# Patient Record
Sex: MALE | Employment: FULL TIME | URBAN - METROPOLITAN AREA
[De-identification: names, ages, dates, MRNs, and addresses within clinical notes are randomized per-mention and may not be internally consistent; named-entity substitution may affect disease eponyms.]

---

## 2024-04-04 ENCOUNTER — OFFICE VISIT (OUTPATIENT)
Age: 39
End: 2024-04-04
Payer: COMMERCIAL

## 2024-04-04 DIAGNOSIS — E11.69 TYPE 2 DIABETES MELLITUS WITH OTHER SPECIFIED COMPLICATION, WITHOUT LONG-TERM CURRENT USE OF INSULIN (HCC): Primary | ICD-10-CM

## 2024-04-04 PROCEDURE — G0108 DIAB MANAGE TRN  PER INDIV: HCPCS

## 2024-05-07 ENCOUNTER — NURSE ONLY (OUTPATIENT)
Age: 39
End: 2024-05-07
Payer: COMMERCIAL

## 2024-05-07 DIAGNOSIS — E11.69 TYPE 2 DIABETES MELLITUS WITH OTHER SPECIFIED COMPLICATION, WITHOUT LONG-TERM CURRENT USE OF INSULIN (HCC): Primary | ICD-10-CM

## 2024-05-07 PROCEDURE — G0109 DIAB MANAGE TRN IND/GROUP: HCPCS

## 2024-05-08 NOTE — PROGRESS NOTES
[facility]  Diabetes Self-Management Education & Support Program  Encounter Note      SUMMARY  Diabetes self-care management training was completed related to reducing risks and monitoringr. The participant will return on May 14 to continue DSMES related to healthy eating and monitoring. The participant did not identify SMART Goal(s) and will practice knowledge and skills related to reducing risks, healthy eating and monitoring, being active and medications, and healthy coping and problem solving to improve diabetes self-management.        EVALUATION:  Mr. Bear expressed understanding of all topics related to Reducing Risks, see boxes below for details.  Mr. Bear is due for the influenza and pneumonia vaccines, a DM eye exam, dental exam, and DM foot exam (he does check his feet at home and denies wounds/infection/neuropathy).  He understands his labs related to DM, is taking all DM medications as directed, and denies symptoms of sleep apnea.  He was very engaged in class and shared how he uses CGM, which actually helped another patient make the decision to  her CGM from her pharmacy, which had not done out of fear of applying the sensor.     RECOMMENDATIONS:  Mr. Bear would benefit from scheduling exams for eye and dental care as soon as possible.    TOPICS DISCUSSED TODAY:  WHAT IS DIABETES? Minutes: 30  HOW DO I STAY HEALTHY? 60  HOW CAN BLOOD GLUCOSE MONITORING HELP ME? 30      Next provider visit is scheduled for 5/14/2024 for DSMES       SMART GOAL(S)   TBD  ACHIEVEMENT OF GOAL(S) :     2.     ACHIEVEMENT OF GOAL(S) :      3.     ACHIEVEMENT OF GOAL(S) :           DATE DSMES TOPIC EVALUATION     5/8/2024 WHAT IS DIABETES?   Role of the normal pancreas in energy balance and blood glucose control   The defect seen in diabetes   Signs & symptoms of diabetes   Diagnosis of diabetes   Types of diabetes   Blood glucose targets in non-pregnant & non-pregnant adults

## 2024-05-14 ENCOUNTER — NURSE ONLY (OUTPATIENT)
Age: 39
End: 2024-05-14
Payer: COMMERCIAL

## 2024-05-14 DIAGNOSIS — E11.69 TYPE 2 DIABETES MELLITUS WITH OTHER SPECIFIED COMPLICATION, WITHOUT LONG-TERM CURRENT USE OF INSULIN (HCC): Primary | ICD-10-CM

## 2024-05-14 PROCEDURE — G0109 DIAB MANAGE TRN IND/GROUP: HCPCS

## 2024-05-15 NOTE — PROGRESS NOTES
ACHIEVEMENT OF GOAL(S) :      3.     ACHIEVEMENT OF GOAL(S) :         DATE DSMES TOPIC EVALUATION     5/15/2024 WHAT CAN I EAT?   General principles   Determining a healthy weight   Nutritional terms & tools   Healthy Plate method   Carbohydrate Counting   Reading food labels   Free apps   Pregnancy recommendations      The participant   Uses Healthy Plate principles in constructing meals: Yes  Reads food labels in choosing acceptable foods: Yes    The participant needs to address - see SMART Goal above.     DATE DSMES TOPIC EVALUATION     5/15/2024 HOW CAN BLOOD GLUCOSE MONITORING HELP ME?   Value of blood glucose monitoring   Realistic expectations   Blood glucose monitoring targets   Target adjustments   Setting a1c & blood glucose targets with provider   Meter selection    Technique for obtaining blood droplet   Blood glucose testing sites   Determining best times to test   Pregnancy recommendations   Data sharing with provider        The participant   Can demonstrate their glucometer procedure: Yes  Logs their BG readings:  Yes    The participant needs to address n/a, using CGM to it's greatest potential.     ALVIN VAN RN on 5/15/2024 at 1:28 PM    I have personally reviewed the health record, including provider notes, laboratory data and current medications before making these care and education recommendations. The time spent in this effort is included in the total time.  Total minutes: 120

## 2024-05-21 ENCOUNTER — NURSE ONLY (OUTPATIENT)
Age: 39
End: 2024-05-21
Payer: COMMERCIAL

## 2024-05-21 DIAGNOSIS — E11.69 TYPE 2 DIABETES MELLITUS WITH OTHER SPECIFIED COMPLICATION, WITHOUT LONG-TERM CURRENT USE OF INSULIN (HCC): Primary | ICD-10-CM

## 2024-05-21 PROCEDURE — G0109 DIAB MANAGE TRN IND/GROUP: HCPCS

## 2024-05-23 NOTE — PROGRESS NOTES
[facility]  Diabetes Self-Management Education & Support Program  Encounter Note      SUMMARY  Diabetes self-care management training was completed related to taking medications and physical activity. The participant will return on May 28 to continue DSMES related to healthy coping and problem solving. The participant did not identify SMART Goal(s) and will practice knowledge and skills related to reducing risks, healthy eating and monitoring, being active and medications, and healthy coping and problem solving to improve diabetes self-management.        EVALUATION:  Mr. Bear achieved his SMART Goal from last week to find one recipe and perfect it to meet DM Healthy Eating recommendation. He has been taking that a step further by cooking for his family members, who also have DM and other conditions that require specific dietary modifications, he states he is enjoying figuring out ways to cook food that meets everyone's needs and still tastes great.     Today we covered Medications and Physical Activity, he expressed understanding of all topics, see boxes below for details.  Mr. Bear is taking all DM medications as prescribed.  He is hopeful that some of his DM medications can either be stopped or reduced when his A1c reaches target, which it is based on his current GMI CGM data - 5.9%.  He understands that his medication regimen will change over time in response to changes in his DM.  Mr. Bear already has an established and well rounded physical activity routine that includes aerobic and resistance activities, he does not need to make any changes at this time.  He is mindful of the risk of hypoglycemia associated with physical activity and is working to prevent episodes but is prepared to treat, if needed.     Mr. Bear has scheduled a DM eye exam.          RECOMMENDATIONS:  Great job all the way around! No recommendations at this time.     TOPICS DISCUSSED TODAY:  HOW DO

## 2024-05-28 ENCOUNTER — NURSE ONLY (OUTPATIENT)
Age: 39
End: 2024-05-28
Payer: COMMERCIAL

## 2024-05-28 DIAGNOSIS — E11.69 TYPE 2 DIABETES MELLITUS WITH OTHER SPECIFIED COMPLICATION, WITHOUT LONG-TERM CURRENT USE OF INSULIN (HCC): Primary | ICD-10-CM

## 2024-05-28 PROCEDURE — G0109 DIAB MANAGE TRN IND/GROUP: HCPCS

## 2024-05-30 NOTE — PROGRESS NOTES
[facility]  Diabetes Self-Management Education & Support Program  Encounter Note      SUMMARY  Diabetes self-care management training was completed related to healthy coping and problem solving. The participant will return on July 12 for DSMES follow-up. The participant did not identify SMART Goal(s) and will practice knowledge and skills related to reducing risks, healthy eating and monitoring, being active and medications, and healthy coping and problem solving to improve diabetes self-management.        EVALUATION:  Mr. Bear expressed understanding of all topics related to Healthy Coping and Problem Solving, see boxes below.  He states he has a great support system of family, friends, and healthcare providers. He denies symptoms of depression and uses healthy coping skills to deal with stressors.  Mr. Bear is prepared to avoid and/or treat hypoglycemia and hyperglycemia, and manage DM appropriately during illness and in the event of disaster or other situation that requires him to be away from home unexpectedly.     Mr. Bear's CGM data continues to be outstanding.       RECOMMENDATIONS:  Great job applying the information and strategies from DSMES to daily DM mgmt.  Please have A1c lab completed prior to DSMES follow-up appointment on 7/12/2024.     TOPICS DISCUSSED TODAY:  HOW DO I FIND SUPPORT TO TACKLE THIS CONDITION? 60  HOW DO I FIGURE OUT WHAT'S INFLUENCING MY BLOOD GLUCOSES? 30      Next provider visit is scheduled for 7/12/2024 with Ann Gregory RDN for DSMES follow-up.       SMART GOAL(S)  Find one recipe and perfect it to meet DM Healthy Eating recommendations before the next group class on 5/21/2024.   ACHIEVEMENT OF GOAL(S) : %       DATE DSMES TOPIC EVALUATION     5/30/2024 HOW DO I FIND SUPPORT TO TACKLE THIS CONDITION?   Normal responses to diabetes diagnosis or complication   Shock   Anger & resentment   Guilt/self-blame   Sadness & worry   Depression

## 2024-07-12 ENCOUNTER — OFFICE VISIT (OUTPATIENT)
Age: 39
End: 2024-07-12
Payer: COMMERCIAL

## 2024-07-12 DIAGNOSIS — E11.69 TYPE 2 DIABETES MELLITUS WITH OTHER SPECIFIED COMPLICATION, WITHOUT LONG-TERM CURRENT USE OF INSULIN (HCC): Primary | ICD-10-CM

## 2024-07-12 PROCEDURE — G0108 DIAB MANAGE TRN  PER INDIV: HCPCS

## 2024-07-12 NOTE — PROGRESS NOTES
[facility]  Diabetes Self-Management Education & Support Program  Post-program Evaluation    EVALUATION @ COMPLETION OF THE PROGRAM    Carlos Bear completed 9 hours of diabetes self-management education. The participant acquired new knowledge and demonstrated new skills during the program.     The participant worked on the following SMART GOAL(s) to improve their diabetes self-management:    Find one recipe and perfect it to meet DM Healthy Eating recommendations before the next group class on 5/21/2024.   ACHIEVEMENT OF GOAL(S) : %    The participant's pre-program A1c was 11.4. The post-evaluation A1c is 6.0.    The participant improved their Diabetes Skills, Confidence and Preparedness Index (scored out of 7):    Total score: 6.7  Skills: 6.6  Confidence: 6.7  Preparedness: 6.8    FINAL RECOMMENDATIONS:  Min has done an outstanding job of applying the DM mgmt strategies discussed in class to daily life.  I am confident that he has the skill set and attitude to continue to successfully manage DM for the long-term.  No recommendations at this time.    Next provider visit is scheduled for n/a.       ALVIN VAN RN on 7/12/2024     Metric Patient's responses (7/12/2024) Areas for improvement     Healthy Eating       The participant is using the Healthy Plate to control carbohydrate intake - Yes    The participant reads food labels accurately - Yes          Being Active       The participant performs physical activity where your heart beats faster and your breathing is harder than normal for 30 minutes or more? 5 day(s)     In a typical week, the participant performs physical activity 5 day(s)          Monitoring   The participant is monitoring their blood sugars? Yes    The participant is monitoring >5x/day    Blood sugar ranges are  mg/dL    The participant improved their A1c - Yes    The participant understands the meaning of the A1c - Yes        Taking